# Patient Record
Sex: FEMALE | Race: WHITE | RURAL
[De-identification: names, ages, dates, MRNs, and addresses within clinical notes are randomized per-mention and may not be internally consistent; named-entity substitution may affect disease eponyms.]

---

## 2017-11-01 ENCOUNTER — OFFICE VISIT (OUTPATIENT)
Dept: FAMILY MEDICINE CLINIC | Age: 37
End: 2017-11-01

## 2017-11-01 VITALS
HEART RATE: 66 BPM | SYSTOLIC BLOOD PRESSURE: 135 MMHG | TEMPERATURE: 97.3 F | RESPIRATION RATE: 20 BRPM | HEIGHT: 67 IN | WEIGHT: 145 LBS | DIASTOLIC BLOOD PRESSURE: 74 MMHG | OXYGEN SATURATION: 100 % | BODY MASS INDEX: 22.76 KG/M2

## 2017-11-01 DIAGNOSIS — F41.1 GAD (GENERALIZED ANXIETY DISORDER): ICD-10-CM

## 2017-11-01 DIAGNOSIS — E03.9 ACQUIRED HYPOTHYROIDISM: Primary | ICD-10-CM

## 2017-11-01 RX ORDER — LEVOTHYROXINE SODIUM 25 UG/1
25 TABLET ORAL
Qty: 90 TAB | Refills: 1
Start: 2017-11-01 | End: 2018-03-07 | Stop reason: SDUPTHER

## 2017-11-01 RX ORDER — SERTRALINE HYDROCHLORIDE 50 MG/1
50 TABLET, FILM COATED ORAL DAILY
Qty: 90 TAB | Refills: 1
Start: 2017-11-01 | End: 2018-04-25 | Stop reason: SDUPTHER

## 2017-11-01 NOTE — MR AVS SNAPSHOT
Visit Information Date & Time Provider Department Dept. Phone Encounter #  
 11/1/2017  3:00 PM Beni Pimentel MD [facility] Primary Care [phone] [id number] Upcoming Health Maintenance Date Due DTaP/Tdap/Td series (1 - Tdap) 3/24/2002 PAP AKA CERVICAL CYTOLOGY 3/24/2002 INFLUENZA AGE 9 TO ADULT 8/1/2017 Allergies as of 11/1/2017  Review Complete On: 11/1/2017 By: Beni Pimentel MD  
 Not on File Current Immunizations  Never Reviewed No immunizations on file. Not reviewed this visit Vitals BP Pulse Temp Resp Height(growth percentile) Weight(growth percentile) 135/74 (BP 1 Location: Left arm) 66 97.3 °F (36.3 °C) 20 5' 7\" (1.702 m) 145 lb (65.8 kg) SpO2 BMI Smoking Status 100% 22.71 kg/m2 Never Smoker BMI and BSA Data Body Mass Index Body Surface Area  
 22.71 kg/m 2 1.76 m 2 Your Updated Medication List  
  
Notice  As of 11/1/2017  3:26 PM  
 You have not been prescribed any medications. Introducing [facility] & HEALTH SERVICES! Magaly Adam introduces [employer] patient portal. Now you can access parts of your medical record, email your doctor's office, and request medication refills online. 1. In your internet browser, go to https://[employer]([facility]). [employer]) 2. Click on the First Time User? Click Here link in the Sign In box. You will see the New Member Sign Up page. 3. Enter your [employer] Access Code exactly as it appears below. You will not need to use this code after youve completed the sign-up process. If you do not sign up before the expiration date, you must request a new code. · [employer] Access Code: [email or web address] Expires: 1/30/2018  2:55 PM 
 
4. Enter the last four digits of your Social Security Number (xxxx) and Date of Birth (mm/dd/yyyy) as indicated and click Submit. You will be taken to the next sign-up page. 5. Create a [employer] ID. This will be your [employer] login ID and cannot be changed, so think of one that is secure and easy to remember. 6. Create a [employer] password. You can change your password at any time. 7. Enter your Password Reset Question and Answer. This can be used at a later time if you forget your password. 8. Enter your e-mail address. You will receive e-mail notification when new information is available in [address] 9. Click Sign Up. You can now view and download portions of your medical record. 10. Click the Download Summary menu link to download a portable copy of your medical information. If you have questions, please visit the Frequently Asked Questions section of the [employer] website. Remember, [employer] is NOT to be used for urgent needs. For medical emergencies, dial 911. Now available from your iPhone and Android! Please provide this summary of care documentation to your next provider. Your primary care clinician is listed as Carlo Gutierrez. If you have any questions after today's visit, please call [phone].

## 2017-11-01 NOTE — PROGRESS NOTES
Windell Dakins is a 40 y.o. female who presents with the following:  Chief Complaint   Patient presents with    Hypothyroidism    Anxiety       Anxiety   The history is provided by the patient (Patient has had generalized anxiety for quite some time and is found that the 50 mg sertraline dose is keeping this under control and keeping her feeling well. ). Pertinent negatives include no chest pain, no abdominal pain, no headaches and no shortness of breath. Thyroid Problem   The history is provided by the patient (Patient has had hypothyroidism for a while and is being well managed with 25 mcg of levothyroxine. Patient is having no fatigue or tiredness. ). Pertinent negatives include no chest pain, no abdominal pain, no headaches and no shortness of breath. Not on File    No current outpatient prescriptions on file. No current facility-administered medications for this visit. Past Medical History:   Diagnosis Date    Alopecia of scalp     Anxiety     Constipation     Hypothyroid        Past Surgical History:   Procedure Laterality Date    HX APPENDECTOMY      HX ORTHOPAEDIC      HX TONSILLECTOMY         Family History   Problem Relation Age of Onset    Heart Disease Mother     Hypertension Father     Heart Disease Maternal Grandmother     Stroke Paternal Grandmother     Diabetes Paternal Grandfather     Stroke Paternal Grandfather        Social History     Social History    Marital status:      Spouse name: N/A    Number of children: N/A    Years of education: N/A     Social History Main Topics    Smoking status: Never Smoker    Smokeless tobacco: Never Used    Alcohol use No    Drug use: None    Sexual activity: Not Asked     Other Topics Concern    None     Social History Narrative    None       Review of Systems   Constitutional: Negative for chills, fever, malaise/fatigue and weight loss. HENT: Negative for congestion, hearing loss, sore throat and tinnitus. Eyes: Negative for blurred vision, pain and discharge. Respiratory: Negative for cough, shortness of breath and wheezing. Cardiovascular: Negative for chest pain, palpitations, orthopnea, claudication and leg swelling. Gastrointestinal: Negative for abdominal pain, constipation and heartburn. Genitourinary: Negative for dysuria, frequency and urgency. Musculoskeletal: Negative for falls, joint pain and myalgias. Skin: Negative for itching and rash. Neurological: Negative for dizziness, tingling, tremors and headaches. Endo/Heme/Allergies: Negative for environmental allergies and polydipsia. Psychiatric/Behavioral: Negative for depression and substance abuse. The patient is not nervous/anxious. Visit Vitals    /74 (BP 1 Location: Left arm)    Pulse 66    Temp 97.3 °F (36.3 °C)    Resp 20    Ht 5' 7\" (1.702 m)    Wt 145 lb (65.8 kg)    SpO2 100%    BMI 22.71 kg/m2     Physical Exam   Constitutional: She is oriented to person, place, and time and well-developed, well-nourished, and in no distress. HENT:   Head: Normocephalic and atraumatic. Right Ear: External ear normal.   Left Ear: External ear normal.   Mouth/Throat: Oropharynx is clear and moist.   Eyes: Conjunctivae and EOM are normal. Pupils are equal, round, and reactive to light. Right eye exhibits no discharge. Left eye exhibits no discharge. Neck: Normal range of motion. Neck supple. No tracheal deviation present. No thyromegaly present. Cardiovascular: Normal rate, regular rhythm, normal heart sounds and intact distal pulses. Exam reveals no gallop and no friction rub. No murmur heard. Pulmonary/Chest: Effort normal and breath sounds normal. No respiratory distress. She has no wheezes. She exhibits no tenderness. Abdominal: Soft. Bowel sounds are normal. She exhibits no distension and no mass. There is no tenderness. There is no rebound and no guarding.    Musculoskeletal: She exhibits no edema or tenderness. Lymphadenopathy:     She has no cervical adenopathy. Neurological: She is alert and oriented to person, place, and time. She has normal reflexes. No cranial nerve deficit. She exhibits normal muscle tone. Gait normal. Coordination normal.   Skin: Skin is warm and dry. No rash noted. No erythema. No pallor. Psychiatric: Mood, memory, affect and judgment normal.         ICD-10-CM ICD-9-CM    1. Acquired hypothyroidism E03.9 244.9    2. ELISE (generalized anxiety disorder) F41.1 300.02        No orders of the defined types were placed in this encounter. Patient states she is keeping up with her GYN protocols. Her last lab work was within the last year.     Follow-up Disposition: Not on Equilla Sacks, MD

## 2018-03-07 ENCOUNTER — OFFICE VISIT (OUTPATIENT)
Dept: FAMILY MEDICINE CLINIC | Age: 38
End: 2018-03-07

## 2018-03-07 ENCOUNTER — TELEPHONE (OUTPATIENT)
Dept: FAMILY MEDICINE CLINIC | Age: 38
End: 2018-03-07

## 2018-03-07 VITALS
DIASTOLIC BLOOD PRESSURE: 75 MMHG | SYSTOLIC BLOOD PRESSURE: 110 MMHG | HEIGHT: 67 IN | OXYGEN SATURATION: 96 % | HEART RATE: 60 BPM | RESPIRATION RATE: 18 BRPM | WEIGHT: 149.8 LBS | BODY MASS INDEX: 23.51 KG/M2

## 2018-03-07 DIAGNOSIS — E03.9 ACQUIRED HYPOTHYROIDISM: ICD-10-CM

## 2018-03-07 DIAGNOSIS — E78.00 PURE HYPERCHOLESTEROLEMIA: ICD-10-CM

## 2018-03-07 DIAGNOSIS — D23.9 MULTIPLE DYSPLASTIC NEVI: ICD-10-CM

## 2018-03-07 DIAGNOSIS — F41.1 GAD (GENERALIZED ANXIETY DISORDER): ICD-10-CM

## 2018-03-07 DIAGNOSIS — Z00.00 NORMAL PHYSICAL EXAMINATION: Primary | ICD-10-CM

## 2018-03-07 RX ORDER — LEVOTHYROXINE SODIUM 25 UG/1
25 TABLET ORAL
Qty: 90 TAB | Refills: 1 | Status: SHIPPED | OUTPATIENT
Start: 2018-03-07 | End: 2018-10-29 | Stop reason: SDUPTHER

## 2018-03-07 NOTE — TELEPHONE ENCOUNTER
If there are no symptoms she does not qualify for a sleep apnea test.  I am sorry her dentist is not aware of this but that is just the way it is.

## 2018-03-07 NOTE — PROGRESS NOTES
Jennifer Negrete is a 40 y.o. female who presents with the following:  Chief Complaint   Patient presents with    Complete Physical       Complete Physical   The history is provided by the patient (Patient with thyroidism and generalized anxiety presents for a general medical exam feeling pretty much okay. Patient also has hyperlipidemia which she is been working on with diet. ). Pertinent negatives include no chest pain, no abdominal pain, no headaches and no shortness of breath. Associated symptoms comments: Patient and  are anticipating starting a family soon and we have discussed getting off of her sertraline by weaning it off slowly. .       Allergies   Allergen Reactions    Morphine Anaphylaxis       Current Outpatient Prescriptions   Medication Sig    levothyroxine (SYNTHROID) 25 mcg tablet Take 1 Tab by mouth Daily (before breakfast).  sertraline (ZOLOFT) 50 mg tablet Take 1 Tab by mouth daily. No current facility-administered medications for this visit.         Past Medical History:   Diagnosis Date    Alopecia of scalp     Anxiety     Constipation     Hypothyroid        Past Surgical History:   Procedure Laterality Date    HX APPENDECTOMY      HX ORTHOPAEDIC      HX TONSILLECTOMY         Family History   Problem Relation Age of Onset    Heart Disease Mother     Hypertension Father     Heart Disease Maternal Grandmother     Stroke Paternal Grandmother     Diabetes Paternal Grandfather     Stroke Paternal Grandfather        Social History     Social History    Marital status:      Spouse name: N/A    Number of children: N/A    Years of education: N/A     Social History Main Topics    Smoking status: Never Smoker    Smokeless tobacco: Never Used    Alcohol use No    Drug use: None    Sexual activity: Not Asked     Other Topics Concern    None     Social History Narrative       Review of Systems   Constitutional: Negative for chills, fever, malaise/fatigue and weight loss.   HENT: Negative for congestion, hearing loss, sore throat and tinnitus. Eyes: Negative for blurred vision, pain and discharge. Respiratory: Negative for cough, shortness of breath and wheezing. Cardiovascular: Negative for chest pain, palpitations, orthopnea, claudication and leg swelling. Gastrointestinal: Negative for abdominal pain, constipation and heartburn. Genitourinary: Negative for dysuria, frequency and urgency. Musculoskeletal: Negative for falls, joint pain and myalgias. Skin: Negative for itching and rash. The patient has a history of dysplastic nevi and sees a dermatologist yearly for mole checks. Neurological: Negative for dizziness, tingling, tremors and headaches. Endo/Heme/Allergies: Negative for environmental allergies and polydipsia. Psychiatric/Behavioral: Negative for depression and substance abuse. The patient is not nervous/anxious. Visit Vitals    /75 (BP 1 Location: Left arm, BP Patient Position: Sitting)    Pulse 60    Resp 18    Ht 5' 7\" (1.702 m)    Wt 149 lb 12.8 oz (67.9 kg)    LMP 02/13/2018    SpO2 96%    BMI 23.46 kg/m2     Physical Exam   Constitutional: She is oriented to person, place, and time and well-developed, well-nourished, and in no distress. HENT:   Head: Normocephalic and atraumatic. Right Ear: External ear normal.   Left Ear: External ear normal.   Mouth/Throat: Oropharynx is clear and moist.   Eyes: Conjunctivae and EOM are normal. Pupils are equal, round, and reactive to light. Right eye exhibits no discharge. Left eye exhibits no discharge. Neck: Normal range of motion. Neck supple. No tracheal deviation present. No thyromegaly present. Cardiovascular: Normal rate, regular rhythm, normal heart sounds and intact distal pulses. Exam reveals no gallop and no friction rub. No murmur heard. Pulmonary/Chest: Effort normal and breath sounds normal. No respiratory distress. She has no wheezes.  She exhibits no tenderness. Abdominal: Soft. Bowel sounds are normal. She exhibits no distension and no mass. There is no tenderness. There is no rebound and no guarding. Musculoskeletal: She exhibits no edema or tenderness. Lymphadenopathy:     She has no cervical adenopathy. Neurological: She is alert and oriented to person, place, and time. She has normal reflexes. No cranial nerve deficit. She exhibits normal muscle tone. Gait normal. Coordination normal.   Skin: Skin is warm and dry. No rash noted. No erythema. No pallor. Psychiatric: Mood, memory, affect and judgment normal.         ICD-10-CM ICD-9-CM    1. Normal physical examination Z00.00 V70.9    2. Acquired hypothyroidism E03.9 244.9 levothyroxine (SYNTHROID) 25 mcg tablet      METABOLIC PANEL, COMPREHENSIVE      THYROID PANEL W/TSH   3. ELISE (generalized anxiety disorder) F41.1 300.02    4. Pure hypercholesterolemia J15.85 436.3 METABOLIC PANEL, COMPREHENSIVE      LIPID PANEL   5. Multiple dysplastic nevi D23.9 216.9        Orders Placed This Encounter    METABOLIC PANEL, COMPREHENSIVE    LIPID PANEL    THYROID PANEL W/TSH    levothyroxine (SYNTHROID) 25 mcg tablet     Sig: Take 1 Tab by mouth Daily (before breakfast).      Dispense:  90 Tab     Refill:  1   I discussed with the patient either going to [address] [place] [address] or [place] for a dermatologist if dermatologist in [place] elects to going to teaching    Follow-up Disposition: Not on Vika Sevilla MD

## 2018-03-07 NOTE — MR AVS SNAPSHOT
[address] [place] [address] [phone] Patient: Chloe Choudhary MRN: [id number] Visit Information Date & Time Provider Department Dept. Phone Encounter #  
 3/7/2018 10:00 AM Zelalem Alegre MD [address] [place] [address] [id number] Upcoming Health Maintenance Date Due  
 PAP AKA CERVICAL CYTOLOGY 3/7/2021 DTaP/Tdap/Td series (2 - Td) 5/19/2025 Allergies as of 3/7/2018  Review Complete On: 3/7/2018 By: Zelalem Alegre MD  
  
 Severity Noted Reaction Type Reactions Morphine High 03/07/2018    Anaphylaxis Current Immunizations  Never Reviewed Name Date Tdap 5/19/2015 Not reviewed this visit You Were Diagnosed With   
  
 Codes Comments Normal physical examination    -  Primary ICD-10-CM: Z00.00 ICD-9-CM: V70.9 Acquired hypothyroidism     ICD-10-CM: E03.9 ICD-9-CM: 244.9   
 ELISE (generalized anxiety disorder)     ICD-10-CM: F41.1 ICD-9-CM: 300.02 Pure hypercholesterolemia     ICD-10-CM: E78.00 ICD-9-CM: 272.0 Vitals BP Pulse Resp Height(growth percentile) Weight(growth percentile) LMP  
 110/75 (BP 1 Location: Left arm, BP Patient Position: Sitting) 60 18 5' 7\" (1.702 m) 149 lb 12.8 oz (67.9 kg) 02/13/2018 SpO2 BMI OB Status Smoking Status 96% 23.46 kg/m2 Having regular periods Never Smoker Vitals History BMI and BSA Data Body Mass Index Body Surface Area  
 23.46 kg/m 2 1.79 m 2 Preferred Pharmacy Pharmacy Name Phone [address], [phone] [facility]  [address] [facility] Fidencio Ahumada [phone] Your Updated Medication List  
  
   
This list is accurate as of 3/7/18 10:27 AM.  Always use your most recent med list.  
  
  
  
  
 levothyroxine 25 mcg tablet Commonly known as:  SYNTHROID Take 1 Tab by mouth Daily (before breakfast). sertraline 50 mg tablet Commonly known as:  ZOLOFT Take 1 Tab by mouth daily. Prescriptions Sent to Pharmacy Refills  
 levothyroxine (SYNTHROID) 25 mcg tablet 1 Sig: Take 1 Tab by mouth Daily (before breakfast). Class: Normal  
 [facility] [place] [address], [address] [place] [address] [address] Ph #: [phone] Route: Oral  
  
We Performed the Following LIPID PANEL [99840 CPT(R)] METABOLIC PANEL, COMPREHENSIVE [27527 CPT(R)] THYROID PANEL W/TSH [43059 CPT(R)] Introducing [facility] & HEALTH SERVICES! Fernando Foy introduces [employer] patient portal. Now you can access parts of your medical record, email your doctor's office, and request medication refills online. 1. In your internet browser, go to [email or web address] 2. Click on the First Time User? Click Here link in the Sign In box. You will see the New Member Sign Up page. 3. Enter your [employer] Access Code exactly as it appears below. You will not need to use this code after youve completed the sign-up process. If you do not sign up before the expiration date, you must request a new code. · [employer] Access Code: [email or web address] Expires: 6/5/2018 10:27 AM 
 
4. Enter the last four digits of your Social Security Number (xxxx) and Date of Birth (mm/dd/yyyy) as indicated and click Submit. You will be taken to the next sign-up page. 5. Create a [employer] ID. This will be your [employer] login ID and cannot be changed, so think of one that is secure and easy to remember. 6. Create a [employer] password. You can change your password at any time. 7. Enter your Password Reset Question and Answer. This can be used at a later time if you forget your password. 8. Enter your e-mail address. You will receive e-mail notification when new information is available in [address] 9. Click Sign Up. You can now view and download portions of your medical record. 10. Click the Download Summary menu link to download a portable copy of your medical information. If you have questions, please visit the Frequently Asked Questions section of the [employer] website. Remember, [employer] is NOT to be used for urgent needs. For medical emergencies, dial 911. Now available from your iPhone and Android! Please provide this summary of care documentation to your next provider. Your primary care clinician is listed as Sheryl Martin. If you have any questions after today's visit, please call [phone].

## 2018-03-07 NOTE — TELEPHONE ENCOUNTER
Patient called back and stated she forgot to ask during her appointment today that her dentist recommended that her PCP order a sleep apnea test. He is thinking she has sleep apnea from her having a large palate.

## 2018-03-07 NOTE — TELEPHONE ENCOUNTER
You need to call the patient back to find out what specific symptoms that she is having that would help me make a decision whether she really needs a sleep apnea exam or not.

## 2018-03-07 NOTE — TELEPHONE ENCOUNTER
Patient states she was going to ask according to what her dentist told her.   She wasn't aware of any symptoms

## 2018-03-08 LAB
ALBUMIN SERPL-MCNC: 4.4 G/DL (ref 3.5–5.5)
ALBUMIN/GLOB SERPL: 1.4 {RATIO} (ref 1.2–2.2)
ALP SERPL-CCNC: 74 IU/L (ref 39–117)
ALT SERPL-CCNC: 9 IU/L (ref 0–32)
AST SERPL-CCNC: 14 IU/L (ref 0–40)
BILIRUB SERPL-MCNC: 0.4 MG/DL (ref 0–1.2)
BUN SERPL-MCNC: 10 MG/DL (ref 6–20)
BUN/CREAT SERPL: 12 (ref 9–23)
CALCIUM SERPL-MCNC: 9.4 MG/DL (ref 8.7–10.2)
CHLORIDE SERPL-SCNC: 102 MMOL/L (ref 96–106)
CHOLEST SERPL-MCNC: 197 MG/DL (ref 100–199)
CO2 SERPL-SCNC: 23 MMOL/L (ref 18–29)
CREAT SERPL-MCNC: 0.83 MG/DL (ref 0.57–1)
FT4I SERPL CALC-MCNC: 2.3 (ref 1.2–4.9)
GFR SERPLBLD CREATININE-BSD FMLA CKD-EPI: 105 ML/MIN/1.73
GFR SERPLBLD CREATININE-BSD FMLA CKD-EPI: 91 ML/MIN/1.73
GLOBULIN SER CALC-MCNC: 3.1 G/DL (ref 1.5–4.5)
GLUCOSE SERPL-MCNC: 90 MG/DL (ref 65–99)
HDLC SERPL-MCNC: 44 MG/DL
INTERPRETATION, 910389: NORMAL
LDLC SERPL CALC-MCNC: 131 MG/DL (ref 0–99)
POTASSIUM SERPL-SCNC: 4.4 MMOL/L (ref 3.5–5.2)
PROT SERPL-MCNC: 7.5 G/DL (ref 6–8.5)
SODIUM SERPL-SCNC: 140 MMOL/L (ref 134–144)
T3RU NFR SERPL: 28 % (ref 24–39)
T4 SERPL-MCNC: 8.1 UG/DL (ref 4.5–12)
TRIGL SERPL-MCNC: 108 MG/DL (ref 0–149)
TSH SERPL DL<=0.005 MIU/L-ACNC: 1.78 UIU/ML (ref 0.45–4.5)
VLDLC SERPL CALC-MCNC: 22 MG/DL (ref 5–40)

## 2018-03-08 NOTE — TELEPHONE ENCOUNTER
Sleep scale done patient scored 2. Dr. Jeny Tran is aware.  Please order if you can if not we will have to call patient

## 2018-04-25 DIAGNOSIS — F41.1 GAD (GENERALIZED ANXIETY DISORDER): ICD-10-CM

## 2018-04-25 RX ORDER — SERTRALINE HYDROCHLORIDE 50 MG/1
50 TABLET, FILM COATED ORAL DAILY
Qty: 90 TAB | Refills: 1 | Status: SHIPPED | OUTPATIENT
Start: 2018-04-25 | End: 2018-05-02 | Stop reason: SDUPTHER

## 2018-05-02 ENCOUNTER — TELEPHONE (OUTPATIENT)
Dept: FAMILY MEDICINE CLINIC | Age: 38
End: 2018-05-02

## 2018-05-02 DIAGNOSIS — F41.1 GAD (GENERALIZED ANXIETY DISORDER): ICD-10-CM

## 2018-05-02 RX ORDER — SERTRALINE HYDROCHLORIDE 100 MG/1
100 TABLET, FILM COATED ORAL DAILY
Qty: 90 TAB | Refills: 1 | Status: SHIPPED | OUTPATIENT
Start: 2018-05-02 | End: 2019-04-15 | Stop reason: SDUPTHER

## 2018-05-02 NOTE — TELEPHONE ENCOUNTER
Ms. Elizabeth Giuseppe notes back to November state that she was on 50 mg of sertraline and that is why it was filled for that.   It would be very helpful if people would bring in their original bottles with them

## 2018-05-02 NOTE — TELEPHONE ENCOUNTER
Pt called stating zoloft rx was incorrect dose and was sent for 50. She states she has been on 100mg for a little over a year.  She is needing new rx sent to

## 2018-05-02 NOTE — TELEPHONE ENCOUNTER
After further review of patient's scanned medical records the patient had been switched to the 100mg by previous physician and this is actually the first time it has been sent. The last rx that was sent is the 50mg which she has not filled and is requesting to continue the same dose. Please refer to medical records scanned.